# Patient Record
Sex: MALE | Race: WHITE | NOT HISPANIC OR LATINO | Employment: FULL TIME | URBAN - METROPOLITAN AREA
[De-identification: names, ages, dates, MRNs, and addresses within clinical notes are randomized per-mention and may not be internally consistent; named-entity substitution may affect disease eponyms.]

---

## 2020-03-30 ENCOUNTER — HOSPITAL ENCOUNTER (OUTPATIENT)
Dept: RADIOLOGY | Facility: CLINIC | Age: 60
Discharge: HOME | End: 2020-03-30
Attending: FAMILY MEDICINE
Payer: MEDICAID

## 2020-03-30 ENCOUNTER — OFFICE VISIT (OUTPATIENT)
Dept: FAMILY MEDICINE | Facility: CLINIC | Age: 60
End: 2020-03-30
Payer: MEDICAID

## 2020-03-30 VITALS
BODY MASS INDEX: 25.07 KG/M2 | TEMPERATURE: 98 F | OXYGEN SATURATION: 97 % | SYSTOLIC BLOOD PRESSURE: 140 MMHG | HEIGHT: 73 IN | DIASTOLIC BLOOD PRESSURE: 70 MMHG | HEART RATE: 96 BPM | WEIGHT: 189.13 LBS | RESPIRATION RATE: 16 BRPM

## 2020-03-30 DIAGNOSIS — M75.101 NONTRAUMATIC TEAR OF RIGHT SUPRASPINATUS TENDON: ICD-10-CM

## 2020-03-30 DIAGNOSIS — M54.6 CHRONIC MIDLINE THORACIC BACK PAIN: ICD-10-CM

## 2020-03-30 DIAGNOSIS — M00.88: ICD-10-CM

## 2020-03-30 DIAGNOSIS — G89.29 CHRONIC RIGHT SHOULDER PAIN: ICD-10-CM

## 2020-03-30 DIAGNOSIS — M54.50 CHRONIC LOW BACK PAIN, UNSPECIFIED BACK PAIN LATERALITY, UNSPECIFIED WHETHER SCIATICA PRESENT: ICD-10-CM

## 2020-03-30 DIAGNOSIS — M25.511 CHRONIC RIGHT SHOULDER PAIN: ICD-10-CM

## 2020-03-30 DIAGNOSIS — G89.29 CHRONIC LOW BACK PAIN, UNSPECIFIED BACK PAIN LATERALITY, UNSPECIFIED WHETHER SCIATICA PRESENT: ICD-10-CM

## 2020-03-30 DIAGNOSIS — M51.35 DDD (DEGENERATIVE DISC DISEASE), THORACOLUMBAR: ICD-10-CM

## 2020-03-30 DIAGNOSIS — G89.29 CHRONIC MIDLINE THORACIC BACK PAIN: ICD-10-CM

## 2020-03-30 DIAGNOSIS — M67.911 ROTATOR CUFF DISORDER, RIGHT: Primary | ICD-10-CM

## 2020-03-30 PROCEDURE — 73030 X-RAY EXAM OF SHOULDER: CPT | Mod: 26,RT,S$GLB, | Performed by: RADIOLOGY

## 2020-03-30 PROCEDURE — 99204 PR OFFICE/OUTPT VISIT, NEW, LEVL IV, 45-59 MIN: ICD-10-PCS | Mod: S$PBB,25,, | Performed by: FAMILY MEDICINE

## 2020-03-30 PROCEDURE — 20610 DRAIN/INJ JOINT/BURSA W/O US: CPT | Mod: PBBFAC,RT,PO | Performed by: FAMILY MEDICINE

## 2020-03-30 PROCEDURE — 72110 XR LUMBAR SPINE COMPLETE 5 VIEW: ICD-10-PCS | Mod: 26,,, | Performed by: RADIOLOGY

## 2020-03-30 PROCEDURE — 73030 X-RAY EXAM OF SHOULDER: CPT | Mod: TC,FY,PO,RT

## 2020-03-30 PROCEDURE — 99204 OFFICE O/P NEW MOD 45 MIN: CPT | Mod: S$PBB,25,, | Performed by: FAMILY MEDICINE

## 2020-03-30 PROCEDURE — 72110 X-RAY EXAM L-2 SPINE 4/>VWS: CPT | Mod: TC,FY,PO

## 2020-03-30 PROCEDURE — 20610 LARGE JOINT ASPIRATION/INJECTION: R SUBACROMIAL BURSA: ICD-10-PCS | Mod: S$PBB,RT,, | Performed by: FAMILY MEDICINE

## 2020-03-30 PROCEDURE — 99999 PR PBB SHADOW E&M-NEW PATIENT-LVL V: ICD-10-PCS | Mod: PBBFAC,,, | Performed by: FAMILY MEDICINE

## 2020-03-30 PROCEDURE — 99205 OFFICE O/P NEW HI 60 MIN: CPT | Mod: PBBFAC,25,PO | Performed by: FAMILY MEDICINE

## 2020-03-30 PROCEDURE — 72110 X-RAY EXAM L-2 SPINE 4/>VWS: CPT | Mod: 26,,, | Performed by: RADIOLOGY

## 2020-03-30 PROCEDURE — 99999 PR PBB SHADOW E&M-NEW PATIENT-LVL V: CPT | Mod: PBBFAC,,, | Performed by: FAMILY MEDICINE

## 2020-03-30 PROCEDURE — 73030 XR SHOULDER COMPLETE 2 OR MORE VIEWS RIGHT: ICD-10-PCS | Mod: 26,RT,S$GLB, | Performed by: RADIOLOGY

## 2020-03-30 RX ORDER — METHYLPREDNISOLONE ACETATE 80 MG/ML
80 INJECTION, SUSPENSION INTRA-ARTICULAR; INTRALESIONAL; INTRAMUSCULAR; SOFT TISSUE
Status: DISCONTINUED | OUTPATIENT
Start: 2020-03-30 | End: 2020-03-30 | Stop reason: HOSPADM

## 2020-03-30 RX ORDER — HYDROCODONE BITARTRATE AND ACETAMINOPHEN 5; 325 MG/1; MG/1
1 TABLET ORAL EVERY 4 HOURS PRN
COMMUNITY
Start: 2020-03-28 | End: 2020-04-02

## 2020-03-30 RX ADMIN — METHYLPREDNISOLONE ACETATE 80 MG: 80 INJECTION, SUSPENSION INTRA-ARTICULAR; INTRALESIONAL; INTRAMUSCULAR; SOFT TISSUE at 01:03

## 2020-03-30 NOTE — PATIENT INSTRUCTIONS
Rotator Cuff Tear  The rotator cuff is a group of muscles and tendons that surround the shoulder joint. These muscles and tendons hold the arm in its joint. They also help the shoulder to rotate. The rotator cuff can be torn from overuse or injury. Gradual wear and tear can lead to inflammation of these tendons. This can progress to gradual or sudden tears.  Symptoms of a torn rotator cuff include:  · Shoulder pain that gets worse when you raise your arm overhead  · Weakness of the shoulder muscles with overhead activity  · Popping and clicking when you move your shoulder  · Shoulder pain that wakes you up at night when sleeping on the hurt shoulder  Diagnosis is made by an MRI or arthroscopy. This is a surgical procedure to look inside the joint through a small tube. Partial rotator cuff tears can be treated by first resting, then strengthening the rotator cuff muscles.  Anti-inflammatory medicines, such as ibuprofen or naproxen, are useful. A limited number of steroid injections can be given. Surgery may be recommended for complete tears and partial tears that do not respond to medical treatment.  Home care  · Avoid activities that make your pain worse. This includes overhead activities, doing the same motion over and over, and heavy lifting.  · You may use over-the-counter pain medicines to control pain, unless another medicine was prescribed. If you have chronic liver or kidney disease or ever had a stomach ulcer or GI bleeding, talk with your healthcare provider before using these medicines.  · If you were given a sling, use it for comfort. After your pain decreases, dont keep your arm in the sling all the time. Take your arm out several times a day and move the shoulder joint, as you are able.  · Your healthcare provider may recommend gentle pendulum exercises. Stand or sit with your arm vertical and close to your side. Relax your shoulder muscles and gently swing the arm forward and back, side to side, and  in small circles for about 5 minutes. Do this once or twice a day. There should be only slight pain with this exercise.  · You may benefit from physical therapy or a home exercise program to strengthen your shoulder muscles. This will also increase your pain-free range of motion. Applying heat prior to exercises can help prepare the muscles and joint for activity. Talk to your healthcare provider about what is best for your condition.  Follow-up care  Follow up with your healthcare provider, or as advised.  When to seek medical advice  Call your healthcare provider right away if any of the following occur:  · Increasing shoulder pain  · Rapid swelling in the involved shoulder or arm  · Numbness, tingling, or pain radiating down the arm to the hand  · Loss of strength in the affected arm  Date Last Reviewed: 11/23/2015  © 5070-9697 [employer]. [address] [place] [address], [place] [address]. All rights reserved. This information is not intended as a substitute for professional medical care. Always follow your healthcare professional's instructions.

## 2020-03-30 NOTE — PROGRESS NOTES
Subjective:          Chief Complaint: Curt Rogers is a 59 y.o. male who had concerns including Back Pain (back and shoulder pain ).    Patient here today with complaints of back pain and right shoulder pain.    The back pain he describes as being along his entire spine from his neck to his butt.  Most of his pain is located in the mid back region.  This has been there for years.  Has seen  several times for to have tried multiple medications including Voltaren muscle relaxers hydrocodone and Mobic with very little relief.  He notes that years ago he had injections done to his lower back possibly SI.  He states that that did temporarily relieve his pain.    His right shoulder has been hurting him for 4-5 weeks now.  He cannot recall any trauma or any injury to it.  He notes that it hurts him to lift his arm overhead her to pick any items up.  He is finding it difficult to use his arm because of shoulder pain.  He has tried Tylenol with no relief.  He has tried hydrocodone with some help.  He denies any numbness or tingling down the arm.          Review of Systems   Constitution: Negative for chills and decreased appetite.   HENT: Negative for congestion and sore throat.    Eyes: Negative for blurred vision.   Cardiovascular: Negative for chest pain, dyspnea on exertion and palpitations.   Respiratory: Negative for cough and shortness of breath.    Skin: Negative for rash.   Neurological: Negative for difficulty with concentration, disturbances in coordination and headaches.   Psychiatric/Behavioral: Negative for altered mental status, depression, hallucinations, memory loss and suicidal ideas.                   Objective:        General: Curt is well-developed, well-nourished, appears stated age, in no acute distress, alert and oriented to time, place and person.     General    Nursing note and vitals reviewed.  Constitutional: He is oriented to person, place, and time. He appears well-developed and  well-nourished.   HENT:   Nose: Nose normal.   Eyes: EOM are normal. Pupils are equal, round, and reactive to light.   Neck: Normal range of motion. Neck supple.   Cardiovascular: Normal rate.    Pulmonary/Chest: Effort normal.   Abdominal: Soft.   Neurological: He is alert and oriented to person, place, and time. He has normal reflexes.   Psychiatric: He has a normal mood and affect. His behavior is normal. Judgment and thought content normal.         Back (L-Spine & T-Spine) / Neck (C-Spine) Exam     Tenderness   The patient is tender to palpation of the right scapular.   Right Shoulder Exam     Inspection/Observation   Scars: absent  Deformity: absent  Atrophy: absent    Tenderness   The patient is tender to palpation of the supraspinatus.    Range of Motion   Active abduction: 160   Passive abduction: 170   Extension: 50   Forward Flexion: 150 Adduction: 90     Tests & Signs   Apprehension: negative  Cross arm: negative  Drop arm: negative  Ayala test: positive  Impingement: positive  Rotator Cuff Painful Arc/Range: mild  Lift Off Sign: negative  Belly Press: positive  Active Compression Test ([place]'s Sign): positive  Yergason's Test: negative  Speed's Test: negative  Anterior Drawer Test: 0   Posterior Drawer Test: 0    Other   Sensation: normal    Left Shoulder Exam   Left shoulder exam is normal.    Range of Motion   Active abduction: normal   Passive abduction: normal   Extension: normal   Forward Flexion: normal   Forward Elevation: normal  Adduction: normal      Muscle Strength   Right Upper Extremity   Shoulder Abduction: 5/5   Shoulder Internal Rotation: 5/5   Shoulder External Rotation: 4/5   Supraspinatus: 4/5/5   Subscapularis: 5/5/5   Biceps: 5/5/5   Left Upper Extremity  Shoulder Abduction: 5/5   Shoulder Internal Rotation: 5/5   Shoulder External Rotation: 5/5   Supraspinatus: 5/5/5   Subscapularis: 5/5/5   Biceps: 5/5/5     Reflexes     Right Side   Biceps:  2+    Vascular Exam     Right  Pulses      Radial:                    2+        X-ray images ordered obtained interpreted by me.  They show multilevel degenerative changes of the spine most prominent in the T11 through L1 vertebra.  Facet arthritis is noted throughout the lumbar spine.  There are multiple anterior osteophytes.  No fractures noted.    Right shoulder exam is mostly unremarkable.  Well-preserved glenohumeral joint space.  Will preserved AC joint.  Type 2 acromion.  No acute fractures noted.            Assessment:       Encounter Diagnoses   Name Primary?   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  Patient elected for corticosteroid injection into the right shoulder today.    Large Joint Aspiration/Injection: R subacromial bursa  Date/Time: 3/30/2020 1:00 PM  Performed by: Hermann Dixon MD  Authorized by: Hermann Dixon MD     Consent Done?:  Yes (Written)  Indications:  Pain  Site marked: the procedure site was marked    Timeout: prior to procedure the correct patient, procedure, and site was verified    Prep: patient was prepped and draped in usual sterile fashion      Local anesthesia used?: Yes    Local anesthetic:  Topical anesthetic    Details:  Needle Size:  22 G  Ultrasonic Guidance for needle placement?: No    Approach:  Posterior  Location:  Shoulder  Site:  R subacromial bursa  Medications:  80 mg methylPREDNISolone acetate 80  mg/mL  Patient tolerance:  Patient tolerated the procedure well with no immediate complications     Patient also elected for referral to back and spine clinic to inquire about possible facet joint injections for his chronic mid to lower back pain.                    Patient questionnaires may have been collected.

## 2020-04-07 ENCOUNTER — TELEPHONE (OUTPATIENT)
Dept: FAMILY MEDICINE | Facility: CLINIC | Age: 60
End: 2020-04-07

## 2020-04-07 NOTE — TELEPHONE ENCOUNTER
----- Message from Casandra Garcia sent at 4/7/2020 11:32 AM CDT -----  Patient is calling to request that you order an MRi for his back.  He is filing for disability has told him that he needs to have one done.  Please call him about this at [phone].  Thank you!

## 2025-02-11 ENCOUNTER — TELEPHONE (OUTPATIENT)
Dept: HEMATOLOGY/ONCOLOGY | Facility: CLINIC | Age: 65
End: 2025-02-11

## 2025-02-11 NOTE — TELEPHONE ENCOUNTER
----- Message from Jabier sent at 2/11/2025  1:38 PM CST -----  Regarding: [facility] ref by Dr Fofana Dx  B-cell lymphoma  Media

## 2025-02-12 ENCOUNTER — TELEPHONE (OUTPATIENT)
Dept: HEMATOLOGY/ONCOLOGY | Facility: CLINIC | Age: 65
End: 2025-02-12

## 2025-02-12 NOTE — TELEPHONE ENCOUNTER
----- Message from Jabier sent at 2/11/2025  1:38 PM CST -----  Regarding: [facility] ref by Dr Fofana Dx  B-cell lymphoma  Media

## 2025-02-12 NOTE — NURSING
Oncology Navigation   Intake  Cancer Type: Lymphoma  Type of Referral: External  Date of Referral: 02/11/25  Initial Nurse Navigator Contact: 02/12/25  Referral to Initial Contact Timeline (days): 1  First Appointment Available: 02/19/25  Appointment Date: 02/26/25  First Available Date vs. Scheduled Date (days): 7     Treatment                              Acuity      Follow Up  No follow-ups on file.

## 2025-02-12 NOTE — TELEPHONE ENCOUNTER
Called and spoke to Patient .  Appointment scheduled on 3/26/25 with Dr Gardner.  All questions and concerns addressed.   Provided my name and direct number and instructed Patient  to call with any questions and/or concerns.       RILEY IbrahimN, RN-BC  BMT Nurse Navigator  [facility] [address], [place], [place] [address]  _________________________  o [phone]

## 2025-02-26 ENCOUNTER — LAB VISIT (OUTPATIENT)
Dept: LAB | Facility: HOSPITAL | Age: 65
End: 2025-02-26

## 2025-02-26 ENCOUNTER — OFFICE VISIT (OUTPATIENT)
Dept: HEMATOLOGY/ONCOLOGY | Facility: CLINIC | Age: 65
End: 2025-02-26

## 2025-02-26 VITALS
HEART RATE: 77 BPM | BODY MASS INDEX: 26.18 KG/M2 | OXYGEN SATURATION: 97 % | TEMPERATURE: 98 F | HEIGHT: 73 IN | RESPIRATION RATE: 16 BRPM | WEIGHT: 197.56 LBS

## 2025-02-26 DIAGNOSIS — Z90.81 S/P SPLENECTOMY: ICD-10-CM

## 2025-02-26 DIAGNOSIS — C83.07 SPLENIC MARGINAL ZONE B-CELL LYMPHOMA: ICD-10-CM

## 2025-02-26 DIAGNOSIS — C83.07 SPLENIC MARGINAL ZONE B-CELL LYMPHOMA: Primary | ICD-10-CM

## 2025-02-26 LAB
ANISOCYTOSIS BLD QL SMEAR: SLIGHT
BASOPHILS # BLD AUTO: 0.09 K/UL (ref 0–0.2)
BASOPHILS NFR BLD: 0.9 % (ref 0–1.9)
BURR CELLS BLD QL SMEAR: ABNORMAL
DACRYOCYTES BLD QL SMEAR: ABNORMAL
DIFFERENTIAL METHOD BLD: ABNORMAL
EOSINOPHIL # BLD AUTO: 0.2 K/UL (ref 0–0.5)
EOSINOPHIL NFR BLD: 2 % (ref 0–8)
ERYTHROCYTE [DISTWIDTH] IN BLOOD BY AUTOMATED COUNT: 12.5 % (ref 11.5–14.5)
HCT VFR BLD AUTO: 40.8 % (ref 40–54)
HGB BLD-MCNC: 12.9 G/DL (ref 14–18)
HYPOCHROMIA BLD QL SMEAR: ABNORMAL
IMM GRANULOCYTES # BLD AUTO: 0.03 K/UL (ref 0–0.04)
IMM GRANULOCYTES NFR BLD AUTO: 0.3 % (ref 0–0.5)
LYMPHOCYTES # BLD AUTO: 4.4 K/UL (ref 1–4.8)
LYMPHOCYTES NFR BLD: 42.1 % (ref 18–48)
MCH RBC QN AUTO: 31.1 PG (ref 27–31)
MCHC RBC AUTO-ENTMCNC: 31.6 G/DL (ref 32–36)
MCV RBC AUTO: 98 FL (ref 82–98)
MONOCYTES # BLD AUTO: 1.4 K/UL (ref 0.3–1)
MONOCYTES NFR BLD: 12.9 % (ref 4–15)
NEUTROPHILS # BLD AUTO: 4.4 K/UL (ref 1.8–7.7)
NEUTROPHILS NFR BLD: 41.8 % (ref 38–73)
NRBC BLD-RTO: 0 /100 WBC
PLATELET # BLD AUTO: 342 K/UL (ref 150–450)
PLATELET BLD QL SMEAR: ABNORMAL
PMV BLD AUTO: 10.1 FL (ref 9.2–12.9)
POIKILOCYTOSIS BLD QL SMEAR: ABNORMAL
POLYCHROMASIA BLD QL SMEAR: ABNORMAL
RBC # BLD AUTO: 4.15 M/UL (ref 4.6–6.2)
TOXIC GRANULES BLD QL SMEAR: PRESENT
WBC # BLD AUTO: 10.49 K/UL (ref 3.9–12.7)

## 2025-02-26 PROCEDURE — 99213 OFFICE O/P EST LOW 20 MIN: CPT | Mod: PBBFAC | Performed by: INTERNAL MEDICINE

## 2025-02-26 PROCEDURE — 85025 COMPLETE CBC W/AUTO DIFF WBC: CPT | Performed by: INTERNAL MEDICINE

## 2025-02-26 PROCEDURE — 99999 PR PBB SHADOW E&M-EST. PATIENT-LVL III: CPT | Mod: PBBFAC,,, | Performed by: INTERNAL MEDICINE

## 2025-02-26 PROCEDURE — 36415 COLL VENOUS BLD VENIPUNCTURE: CPT | Performed by: INTERNAL MEDICINE

## 2025-02-26 RX ORDER — IBUPROFEN 800 MG/1
800 TABLET ORAL EVERY 6 HOURS PRN
COMMUNITY

## 2025-02-26 RX ORDER — ASPIRIN 81 MG/1
81 TABLET ORAL
COMMUNITY

## 2025-02-26 RX ORDER — ASCORBIC ACID 250 MG
250 TABLET,CHEWABLE ORAL
COMMUNITY

## 2025-02-26 NOTE — PROGRESS NOTES
New Patient - Lymphoma Clinic     Referring Provider: Dr. Brigido Mendez  Reason for referral: History of lymphoma     History of Present Illness:  Mr. Rogers is a very pleasant 64 year old White man with splenic marginal zone lymphoma who was previously followed by me at [facility], last clinic visit on 21 after which he has been lost to follow-up.  Today, he notes occasional L neck muscular pain due to heavy lifting (L-handed and works on farm). Denies fevers/chills, night sweats, decreased appetite, weight loss, fatigue, abd pain, diarrhea, new lumps/bumps.  Previously was constipated but relieved with water intake and stool softener.     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  No EtOH/IDU.  Lives alone w/ pet dog.  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     Family History:  Father - Melanoma, H&N CA  Brother - Bone cancer (type unknown)  Brother - Liver cancer (type unknown, +EtOH use)  All  from cancer       Review of Systems:  As per HPI.  12 point ROS conducted and otherwise negative.     Physical Exam:  Vitals:    25 1415   BP: (P) 125/66   BP Location: Left arm   Patient Position:  "Sitting   Pulse: 77   Resp: 16   Temp: 98 °F (36.7 °C)   TempSrc: Oral   SpO2: 97%   Weight: 89.6 kg (197 lb 8.5 oz)   Height: 6' 1" (1.854 m)       Gen: WDWN, pleasant, talkative, AAOx3, ambulatory, NAD, accompanied by significant other  HEENT: Normocephalic, atraumatic, EOMI, anicteric sclerae  Lymph: no palpable cervical, supraclavicular, axillary, or inguinal LAD  CV: no LE edema  Abd: +BS, soft, NTND, s/p splenectomy  Skin: no rash or lesions  Neuro: CN II-XII grossly intact  Psych: cooperative, normal speech/eye contact, full affect    ECOG Performance Status: 0  Karnofsky PS: 100     Labs/Imaging/Pathology: Reviewed in Epic, pertinent data included in Oncology History    Assessment/Plan:  63 yo man with Stage III splenic MZL, s/p BR x 6 cycles, in surveillance    Splenic MZL  No clinical evidence of disease relapse  No imaging required unless symptomatic  Needs pneumococcal vaccine every 5 years given hx splenectomy  Labs from 1/28/25 reviewed: CMP unremarkable, CBC/d today unremarkable  Continue surveillance      BMT Chart Routing      Follow up with physician 6 months.   Follow up with [facility]    Provider visit type Malignant hem   Infusion scheduling note    Injection scheduling note    Labs    Imaging    Pharmacy appointment    Other referrals             Encounter: 60 min total time spent including time  Preparing to see the patient   Obtaining and/or reviewing separately obtained history   Performing a medically appropriate examination and/or evaluation   Counseling and educating the patient  Ordering tests  Documenting clinical information in the EMR        Abi Gardner MD  Malignant Hematology, Stem Cell Transplantation, and Cellular Therapy  [facility]  "